# Patient Record
Sex: MALE | Race: OTHER | HISPANIC OR LATINO | URBAN - METROPOLITAN AREA
[De-identification: names, ages, dates, MRNs, and addresses within clinical notes are randomized per-mention and may not be internally consistent; named-entity substitution may affect disease eponyms.]

---

## 2019-04-10 ENCOUNTER — EMERGENCY (EMERGENCY)
Facility: HOSPITAL | Age: 34
LOS: 0 days | Discharge: HOME | End: 2019-04-10
Attending: EMERGENCY MEDICINE
Payer: COMMERCIAL

## 2019-04-10 VITALS
OXYGEN SATURATION: 98 % | SYSTOLIC BLOOD PRESSURE: 128 MMHG | HEART RATE: 83 BPM | TEMPERATURE: 98 F | WEIGHT: 199.96 LBS | RESPIRATION RATE: 16 BRPM | DIASTOLIC BLOOD PRESSURE: 84 MMHG | HEIGHT: 70 IN

## 2019-04-10 DIAGNOSIS — J02.9 ACUTE PHARYNGITIS, UNSPECIFIED: ICD-10-CM

## 2019-04-10 PROCEDURE — 99283 EMERGENCY DEPT VISIT LOW MDM: CPT | Mod: 25

## 2019-04-10 RX ORDER — DEXAMETHASONE 0.5 MG/5ML
10 ELIXIR ORAL ONCE
Qty: 0 | Refills: 0 | Status: COMPLETED | OUTPATIENT
Start: 2019-04-10 | End: 2019-04-10

## 2019-04-10 RX ORDER — BENZOCAINE AND MENTHOL 5; 1 G/100ML; G/100ML
1 LIQUID ORAL ONCE
Qty: 0 | Refills: 0 | Status: COMPLETED | OUTPATIENT
Start: 2019-04-10 | End: 2019-04-10

## 2019-04-10 RX ORDER — IBUPROFEN 200 MG
600 TABLET ORAL ONCE
Qty: 0 | Refills: 0 | Status: COMPLETED | OUTPATIENT
Start: 2019-04-10 | End: 2019-04-10

## 2019-04-10 RX ORDER — IBUPROFEN 200 MG
1 TABLET ORAL
Qty: 15 | Refills: 0 | OUTPATIENT
Start: 2019-04-10 | End: 2019-04-14

## 2019-04-10 RX ADMIN — Medication 600 MILLIGRAM(S): at 07:41

## 2019-04-10 RX ADMIN — Medication 1 TABLET(S): at 07:41

## 2019-04-10 RX ADMIN — Medication 10 MILLIGRAM(S): at 07:40

## 2019-04-10 RX ADMIN — BENZOCAINE AND MENTHOL 1 LOZENGE: 5; 1 LIQUID ORAL at 07:41

## 2019-04-10 NOTE — ED ADULT NURSE NOTE - OBJECTIVE STATEMENT
patient states having a sore throat for 4 days, with fever and earache. Patient states having trouble swallowing. No PMH.

## 2019-04-10 NOTE — ED PROVIDER NOTE - PHYSICAL EXAMINATION
Gen: Alert, Well appearing. NAD    Head: NC, AT, PERRL, EOMI, normal lids/conjunctiva   ENT: Bilateral TM WNL,  patent oropharynx with edematous tonsils with erythema and exudate, uvula midline  Neck: supple, no tenderness/meningismus/JVD   Pulm: Bilateral clear BS, normal resp effort, no wheeze/stridor/retractions  CV: RRR, no M/R/G, +dist pulses   Abd: soft, NT/ND, +BS, no guarding/rebound tenderness  Mskel: no edema/erythema/cyanosis   Skin: no rash   Neuro: AAOx3, no sensory/motor deficits, CN 2-12 intact

## 2019-04-10 NOTE — ED PROVIDER NOTE - OBJECTIVE STATEMENT
33yo male with no pertinent pmh presents with sore throat x 3 days, sub fever, and left ear pain.  no cough, sob.     ROS: No fever/chills. No photophobia/eye pain/changes in vision, + ear pain/sore throat., No chest pain/palpitations. No SOB/cough/stridor. No abdominal pain, N/V/D, no black/bloody bm. No dysuria/frequency/discharge, No headache. No Dizziness.  No rash.  No numbness/tingling/weakness.

## 2019-04-10 NOTE — ED PROVIDER NOTE - NSFOLLOWUPINSTRUCTIONS_ED_ALL_ED_FT
Follow up with your primary care doctor within the next 24-48 hours and bring copy of your results.  Return to the Emergency Department for worsening or persistent symptoms or any other concerns incl. chest pain, shortness of breath, dizziness, inability to tolerate oral intake.  Rest, drink plenty of fluids.  Advance activity as tolerated.  Continue all previously prescribed medications as directed. You can use motrin 600mg every 6-8 hours for pain or fever, and/or Tylenol 650 mg every 4 hours for pain/fever.

## 2019-04-10 NOTE — ED ADULT TRIAGE NOTE - CHIEF COMPLAINT QUOTE
patient reports sore throat since saturday radiating to the left ear, unable to sleep last night, used cepacol and salt water gargle

## 2019-10-25 ENCOUNTER — OUTPATIENT (OUTPATIENT)
Dept: OUTPATIENT SERVICES | Facility: HOSPITAL | Age: 34
LOS: 1 days | Discharge: HOME | End: 2019-10-25

## 2019-10-25 PROBLEM — Z78.9 OTHER SPECIFIED HEALTH STATUS: Chronic | Status: ACTIVE | Noted: 2019-04-10

## 2019-10-28 DIAGNOSIS — F43.20 ADJUSTMENT DISORDER, UNSPECIFIED: ICD-10-CM

## 2022-04-15 ENCOUNTER — APPOINTMENT (OUTPATIENT)
Dept: ORTHOPEDIC SURGERY | Facility: CLINIC | Age: 37
End: 2022-04-15

## 2022-04-19 PROBLEM — Z00.00 ENCOUNTER FOR PREVENTIVE HEALTH EXAMINATION: Noted: 2022-04-19

## 2022-04-21 ENCOUNTER — APPOINTMENT (OUTPATIENT)
Dept: ORTHOPEDIC SURGERY | Facility: CLINIC | Age: 37
End: 2022-04-21
Payer: COMMERCIAL

## 2022-04-21 VITALS — HEIGHT: 70 IN | BODY MASS INDEX: 28.63 KG/M2 | WEIGHT: 200 LBS

## 2022-04-21 DIAGNOSIS — Z78.9 OTHER SPECIFIED HEALTH STATUS: ICD-10-CM

## 2022-04-21 PROBLEM — Z00.00 ENCOUNTER FOR PREVENTIVE HEALTH EXAMINATION: Status: ACTIVE | Noted: 2022-04-21

## 2022-04-21 PROCEDURE — 99072 ADDL SUPL MATRL&STAF TM PHE: CPT

## 2022-04-21 PROCEDURE — 99214 OFFICE O/P EST MOD 30 MIN: CPT

## 2022-04-21 RX ORDER — METHOCARBAMOL 750 MG/1
750 TABLET, FILM COATED ORAL
Qty: 60 | Refills: 3 | Status: ACTIVE | COMMUNITY
Start: 2022-04-21 | End: 1900-01-01

## 2022-04-21 NOTE — PHYSICAL EXAM
[Normal Mood and Affect] : normal mood and affect [Orientated] : orientated [Able to Communicate] : able to communicate [Well Developed] : well developed [Well Nourished] : well nourished [NL (0-180)] : full active forward flexion 0-180 degrees [NL (0-90)] : full external rotation 0-90 degrees [NL (30)] : right lateral bending 30 degrees [Flexion] : flexion [Extension] : extension [Left] : left knee [NL (140)] : flexion 140 degrees [NL (0)] : extension 0 degrees [5___] : hamstring 5[unfilled]/5 [Negative] : negative anterior draw [FreeTextEntry8] : Midline cervical tenderness. [de-identified] : extension 20 degrees [de-identified] : left lateral rotation 25 degrees [de-identified] : right lateral flexion 20 degrees [TWNoteComboBox6] : right lateral rotation 45 degrees [TWNoteComboBox7] : forward flexion 75 degrees [FreeTextEntry9] : IR T12  [de-identified] : +Bindu [] : negative Lachmann
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
## 2022-04-21 NOTE — DATA REVIEWED
[Knee] : knee [MRI] : MRI [Shoulder] : shoulder [Report was reviewed and noted in the chart] : The report was reviewed and noted in the chart [Left] : left [FreeTextEntry2] : Impression:\par 1. Significant improvement regarding effusion and synovitis and capsulitis in the glenohumeral joint, with chronic appearing labral tearing suspected throughout the posterior labrum extending into the inferior and superior labrum posteriorly, and mild glenohumeral joint effusion and capsular thickening.\par 2. Mild biceps tenosynovitis without tear.\par 3. Slight subacromial bursitis anteriorly.\par 4. No rotator cuff tear.\par 5. Subtle contour deformity in the anterior aspect of the humeral head likely related to remote posterior shoulder dislocation episode without evidence of new osseous injury.\par 6. Clinical correlation regarding posterior glenohumeral joint instability is recommended.

## 2022-06-06 ENCOUNTER — APPOINTMENT (OUTPATIENT)
Dept: ORTHOPEDIC SURGERY | Facility: CLINIC | Age: 37
End: 2022-06-06
Payer: COMMERCIAL

## 2022-06-06 VITALS — HEIGHT: 70 IN | WEIGHT: 200 LBS | BODY MASS INDEX: 28.63 KG/M2

## 2022-06-06 DIAGNOSIS — S39.012D STRAIN OF MUSCLE, FASCIA AND TENDON OF LOWER BACK, SUBSEQUENT ENCOUNTER: ICD-10-CM

## 2022-06-06 DIAGNOSIS — S13.4XXD SPRAIN OF LIGAMENTS OF CERVICAL SPINE, SUBSEQUENT ENCOUNTER: ICD-10-CM
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
## 2022-06-06 DIAGNOSIS — M51.26 OTHER INTERVERTEBRAL DISC DISPLACEMENT, LUMBAR REGION: ICD-10-CM

## 2022-06-06 DIAGNOSIS — M25.812 OTHER SPECIFIED JOINT DISORDERS, LEFT SHOULDER: ICD-10-CM

## 2022-06-06 DIAGNOSIS — S43.432D SUPERIOR GLENOID LABRUM LESION OF LEFT SHOULDER, SUBSEQUENT ENCOUNTER: ICD-10-CM

## 2022-06-06 DIAGNOSIS — M22.42 CHONDROMALACIA PATELLAE, LEFT KNEE: ICD-10-CM

## 2022-06-06 DIAGNOSIS — M50.20 OTHER CERVICAL DISC DISPLACEMENT, UNSPECIFIED CERVICAL REGION: ICD-10-CM

## 2022-06-06 PROCEDURE — 99072 ADDL SUPL MATRL&STAF TM PHE: CPT

## 2022-06-06 PROCEDURE — 99214 OFFICE O/P EST MOD 30 MIN: CPT

## 2022-06-06 NOTE — PHYSICAL EXAM
[Normal Mood and Affect] : normal mood and affect [Orientated] : orientated [Able to Communicate] : able to communicate [Well Developed] : well developed [Well Nourished] : well nourished [NL (30)] : right lateral bending 30 degrees [Flexion] : flexion [Extension] : extension [NL (0-180)] : full active forward flexion 0-180 degrees [NL (0-90)] : full external rotation 0-90 degrees [Left] : left knee [NL (140)] : flexion 140 degrees [NL (0)] : extension 0 degrees [5___] : hamstring 5[unfilled]/5 [Negative] : negative anterior draw [FreeTextEntry8] : Midline cervical tenderness. [de-identified] : extension 20 degrees [de-identified] : left lateral rotation 25 degrees [de-identified] : right lateral flexion 20 degrees [TWNoteComboBox6] : right lateral rotation 45 degrees [TWNoteComboBox7] : forward flexion 75 degrees [FreeTextEntry9] : IR T12  [de-identified] : +Bindu [] : negative Lachmann
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
## 2022-06-06 NOTE — REASON FOR VISIT
[FreeTextEntry2] : pain neck/ left shoulder with pain level 8 active and 8 resting / and right knee pain with pain level  9 active and 7 resting.

## 2022-06-06 NOTE — PHYSICAL EXAM
[Normal Mood and Affect] : normal mood and affect [Orientated] : orientated [Able to Communicate] : able to communicate [Well Developed] : well developed [Well Nourished] : well nourished [NL (30)] : right lateral bending 30 degrees [Flexion] : flexion [Extension] : extension [NL (0-180)] : full active forward flexion 0-180 degrees [NL (0-90)] : full external rotation 0-90 degrees [Left] : left knee [NL (140)] : flexion 140 degrees [NL (0)] : extension 0 degrees [5___] : hamstring 5[unfilled]/5 [Negative] : negative anterior draw [FreeTextEntry8] : Midline cervical tenderness. [de-identified] : extension 20 degrees [de-identified] : left lateral rotation 25 degrees [de-identified] : right lateral flexion 20 degrees [TWNoteComboBox6] : right lateral rotation 45 degrees [TWNoteComboBox7] : forward flexion 75 degrees [FreeTextEntry9] : IR T12  [de-identified] : +Bindu [] : negative Lachmann
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
## 2022-06-20 ENCOUNTER — APPOINTMENT (OUTPATIENT)
Dept: ORTHOPEDIC SURGERY | Facility: CLINIC | Age: 37
End: 2022-06-20

## 2022-06-27 ENCOUNTER — APPOINTMENT (OUTPATIENT)
Dept: ORTHOPEDIC SURGERY | Facility: CLINIC | Age: 37
End: 2022-06-27
Payer: COMMERCIAL

## 2022-06-27 VITALS — WEIGHT: 200 LBS | HEIGHT: 70 IN | BODY MASS INDEX: 28.63 KG/M2

## 2022-06-27 PROCEDURE — 99204 OFFICE O/P NEW MOD 45 MIN: CPT | Mod: 25

## 2022-06-27 PROCEDURE — 73030 X-RAY EXAM OF SHOULDER: CPT | Mod: LT

## 2022-06-27 PROCEDURE — 99072 ADDL SUPL MATRL&STAF TM PHE: CPT

## 2022-06-27 PROCEDURE — 73010 X-RAY EXAM OF SHOULDER BLADE: CPT | Mod: LT

## 2022-06-27 PROCEDURE — 20611 DRAIN/INJ JOINT/BURSA W/US: CPT
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
## 2022-06-27 NOTE — ASSESSMENT
[FreeTextEntry1] : We discussed the underlying pathology. \par Treatment options reviewed. \par We reviewed his MRI findings. \par PT is prescribed.\par An injection is indicated today. \par If injection fails, there are surgical options. \par Cautions discussed. \par Questions answered. \par \par Patient seen by Prateek Fernandes M.D.\par Entered by Bee Rendon acting as scribe. \par \par Procedure Name: Large Joint Injection / Aspiration: Depomedrol, Lidocaine and Guidance Ultrasound\par \par Large Joint Injection was performed because of pain and inflammation.\par Depomedrol: An injection of Depomedrol 40 mg , 2 cc.\par Lidocaine: An injection of Lidocaine 1 mg , 13 cc.\par \par Medication was injected in the left subacromial space. Patient has tried OTC's including aspirin, Ibuprofen, Aleve etc or prescription NSAIDS, and/or exercises at home and/ or physical therapy without satisfactory response. After verbal consent using sterile preparation and technique. The risks, benefits, and alternatives to cortisone injection were explained in full to the patient. Risks outlined include but are not limited to infection, sepsis, bleeding, scarring, skin discoloration, temporary increase in pain, syncopal episode, failure to resolve symptoms, allergic reaction, symptom recurrence, and elevation of blood sugar in diabetics. Patient understood the risks. All questions were answered. After discussion of options, patient requested an injection. Oral informed consent was obtained and sterile prep was done of the injection site. Sterile technique was utilized for the procedure including the preparation of the solutions used for the injection. Patient tolerated the procedure well. Advised to ice the injection site this evening. Prep with betadine locally to site. Sterile technique used. Patient tolerated procedure well. Post Procedure Instructions: Patient was advised to call if redness, pain, or fever occur and apply ice for 15 min. out of every hour for the next 12-24 hours as tolerated. Patient was advised to rest the joint(s) for 3 days. Ultrasound Guidance was used for the following reasons: for precise injection in area of tear. Visualization of the needle and placement of injection was performed without complication.

## 2022-06-27 NOTE — IMAGING
[Left] : left shoulder [FreeTextEntry1] : The GH joint is good. The AC joint is good. [FreeTextEntry5] : There is a type II-III acromion.
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
## 2022-06-27 NOTE — PHYSICAL EXAM
[Left] : left shoulder [Right] : right shoulder [Severe] : severe [Moderate] : moderate [] : no atrophy [Sitting] : sitting [Minimal] : minimal [TWNoteComboBox4] : passive forward flexion 160 degrees [TWNoteComboBox6] : internal rotation L2 [de-identified] : external rotation 90 degrees

## 2022-06-29 ENCOUNTER — APPOINTMENT (OUTPATIENT)
Dept: PAIN MANAGEMENT | Facility: CLINIC | Age: 37
End: 2022-06-29

## 2022-06-29 DIAGNOSIS — S33.5XXD SPRAIN OF LIGAMENTS OF LUMBAR SPINE, SUBSEQUENT ENCOUNTER: ICD-10-CM

## 2022-07-13 NOTE — DATA REVIEWED
LAST EYLEA TX 2/3/21 WITH DR. Miriam Baker. [FreeTextEntry1] : MRI Left Shoulder 3/25/22:\par \par The AC joint is ok. There are slight biceps changes. There is good muscle. There is a type II-III acromion. There is no major cuff tear.

## 2022-07-15 ENCOUNTER — APPOINTMENT (OUTPATIENT)
Dept: PAIN MANAGEMENT | Facility: CLINIC | Age: 37
End: 2022-07-15

## 2022-07-20 ENCOUNTER — APPOINTMENT (OUTPATIENT)
Dept: PAIN MANAGEMENT | Facility: CLINIC | Age: 37
End: 2022-07-20

## 2022-07-20 VITALS — HEIGHT: 70 IN | WEIGHT: 200 LBS | BODY MASS INDEX: 28.63 KG/M2

## 2022-07-20 PROCEDURE — 99214 OFFICE O/P EST MOD 30 MIN: CPT

## 2022-07-20 PROCEDURE — 99072 ADDL SUPL MATRL&STAF TM PHE: CPT

## 2022-07-20 NOTE — PHYSICAL EXAM
[] : Numbness radiates to right arm with motion [4___] : right triceps 4[unfilled]/5 [de-identified] : left lateral rotation 45 degrees [TWNoteComboBox6] : right lateral rotation 60 degrees

## 2022-07-20 NOTE — ASSESSMENT
[FreeTextEntry1] : After discussing various treatment options with the patient including but not limited to oral medications, physical therapy, exercise, modalities as well as interventional spinal injections, we have decided with the following plan:\par \par 1) Intervention Injection Therapy:\par I personally reviewed the MRI/CT scan images and agree with the radiologist's report. The radiological findings were discussed with the patient.\par The risks, benefits, contents and alternatives to injection were explained in full to the patient. Risks outlined include but are not limited to infection,sepsis, bleeding, post-dural puncture headache, nerve damage, temporary increase in pain, syncopal episode, failure to resolve symptoms, allergic reaction, symptom recurrence, and elevation of blood sugar in diabetics. Cortisone may cause immunosuppression. Patient understands the risks. All questions were answered. After discussion of options, patient requested an injection. Information regarding the injection was given to the patient. Which medications to stop prior to the injection was explained to the patient as well.\par \par Follow up in 1-2 weeks post injection for re-evaluation. \par Continue Home exercises, stretching, activity modification, physical therapy, and conservative care.\par \par Patient is presenting with acute/sub-acute radicular pain with impairment in ADLs and functionality.  The pain has not responded to  conservative care including nsaid therapy and/or physical therapy.  There is no bleeding tendency, unstable medical condition, or systemic infection. \par \par C7-T1 Cervical Epidural Steroid Injection under fluoroscopic guidance with image.
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
## 2022-07-20 NOTE — DATA REVIEWED
[MRI] : MRI [Cervical Spine] : cervical spine [Report was reviewed and noted in the chart] : The report was reviewed and noted in the chart [I independently reviewed and interpreted images and report] : I independently reviewed and interpreted images and report [I reviewed the films/CD and agree] : I reviewed the films/CD and agree

## 2022-07-25 ENCOUNTER — APPOINTMENT (OUTPATIENT)
Dept: ORTHOPEDIC SURGERY | Facility: CLINIC | Age: 37
End: 2022-07-25

## 2022-07-25 VITALS — BODY MASS INDEX: 28.63 KG/M2 | WEIGHT: 200 LBS | HEIGHT: 70 IN

## 2022-07-25 PROCEDURE — 99072 ADDL SUPL MATRL&STAF TM PHE: CPT

## 2022-07-25 PROCEDURE — 99214 OFFICE O/P EST MOD 30 MIN: CPT

## 2022-07-25 RX ORDER — CELECOXIB 200 MG/1
200 CAPSULE ORAL DAILY
Qty: 30 | Refills: 0 | Status: ACTIVE | COMMUNITY
Start: 2022-07-25 | End: 1900-01-01
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
## 2022-07-25 NOTE — ASSESSMENT
[FreeTextEntry1] : .\par We discussed treatment options, both non-operative and operative.  I do think he is a candidate for surgery.  Pain relief is a goal as well as improving function and motion.  I reviewed surgical techniques pictorially in the books that I co-edited.\par \par Interscalene anesthesia, general anesthesia and postoperative pain management were discussed.  The importance of physical therapy postoperatively, the gradual recovery and the rehabilitation program with initial driving restrictions were noted.  The use of a Cryo-Cuff by [employer] and a sling for functional recovery was reviewed.  He understands there are no guarantees.  The benefits of decreased pain, increased function and restoring anatomy were outlined.  The risks were reviewed including, but not limited to, infection, failure, bleeding, stiffness, pain, clotting, fracture, re-tear if there is a repair, hardware failure, deformity, functional limitation, scarring, neurovascular compromise, and narcotic use issues.  Under certain circumstances we discussed, further surgery may be indicated.\par \par He understands that 100% recovery is not assured, and the desired level of function may not be achievable.  We discussed the potential for a prolonged recovery course and the potential for this to affect his activities, which could include a work regimen.  His questions were answered.  Other opinions can be pursued, as we discussed.\par \par He does wish to proceed with surgery.  This would include a left shoulder arthroscopy, debridement, synovectomy, decompression, distal clavicle resection.  We will schedule this at the earliest mutual convenient time.  We will assess his shoulder in light of the prior posterior dislocation from >2 years ago.\par \par Patient seen by Prateek Fernandes M.D.\par Entered by Bee Rendon acting as scribe.

## 2022-07-25 NOTE — PHYSICAL EXAM
[Left] : left shoulder [Severe] : severe [Moderate] : moderate [Right] : right shoulder [Sitting] : sitting [Minimal] : minimal [] : no atrophy [TWNoteComboBox4] : passive forward flexion 160 degrees [de-identified] : external rotation 90 degrees [TWNoteComboBox6] : internal rotation L2

## 2022-07-25 NOTE — DATA REVIEWED
[FreeTextEntry1] : MRI Left Shoulder 3/25/22:  The AC joint is ok. There are slight biceps changes. There is good muscle. There is a type II-III acromion. There is no major cuff tear.

## 2022-08-17 ENCOUNTER — APPOINTMENT (OUTPATIENT)
Dept: PAIN MANAGEMENT | Facility: CLINIC | Age: 37
End: 2022-08-17

## 2022-08-17 VITALS — WEIGHT: 200 LBS | HEIGHT: 70 IN | BODY MASS INDEX: 28.63 KG/M2

## 2022-08-17 PROCEDURE — 99072 ADDL SUPL MATRL&STAF TM PHE: CPT

## 2022-08-17 PROCEDURE — 99455 WORK RELATED DISABILITY EXAM: CPT

## 2022-08-17 NOTE — PHYSICAL EXAM
[Rotation to left] : rotation to left [Left Radial Forearm] : left radial forearm [5] : left 5th digit [Extension] : extension [de-identified] : left lateral rotation 60 degrees [TWNoteComboBox6] : right lateral rotation 75 degrees [] : no swelling [TWNoteComboBox7] : forward flexion 75 degrees [de-identified] : extension 20 degrees
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
## 2022-08-23 ENCOUNTER — APPOINTMENT (OUTPATIENT)
Dept: ORTHOPEDIC SURGERY | Facility: CLINIC | Age: 37
End: 2022-08-23

## 2022-09-12 ENCOUNTER — APPOINTMENT (OUTPATIENT)
Dept: ORTHOPEDIC SURGERY | Facility: CLINIC | Age: 37
End: 2022-09-12

## 2022-09-15 ENCOUNTER — APPOINTMENT (OUTPATIENT)
Age: 37
End: 2022-09-15

## 2022-10-04 ENCOUNTER — APPOINTMENT (OUTPATIENT)
Dept: PAIN MANAGEMENT | Facility: CLINIC | Age: 37
End: 2022-10-04

## 2023-01-23 ENCOUNTER — APPOINTMENT (OUTPATIENT)
Dept: ORTHOPEDIC SURGERY | Facility: CLINIC | Age: 38
End: 2023-01-23
Payer: OTHER MISCELLANEOUS

## 2023-01-23 VITALS — HEIGHT: 70 IN | BODY MASS INDEX: 28.63 KG/M2 | WEIGHT: 200 LBS

## 2023-01-23 DIAGNOSIS — S43.432D SUPERIOR GLENOID LABRUM LESION OF LEFT SHOULDER, SUBSEQUENT ENCOUNTER: ICD-10-CM

## 2023-01-23 PROCEDURE — 99455 WORK RELATED DISABILITY EXAM: CPT

## 2023-01-23 PROCEDURE — 99072 ADDL SUPL MATRL&STAF TM PHE: CPT

## 2023-01-23 NOTE — PHYSICAL EXAM
[Left] : left shoulder [] : no ecchymosis [TWNoteComboBox6] : internal rotation 35 degrees [de-identified] : external rotation 65 degrees

## 2023-01-23 NOTE — HISTORY OF PRESENT ILLNESS
[6] : 6 [5] : 5 [Dull/Aching] : dull/aching [Localized] : localized [Throbbing] : throbbing [de-identified] : has known post dislocation and post labral tear, certain otions make him feels worse and works a s ,on no meds [FreeTextEntry1] : left shoulder  [de-identified] : none

## 2023-01-23 NOTE — WORK
[Has the patient reached Maximum Medical Improvement? If yes, indicate date___] : Yes, on [unfilled] [Is there permanent partial impairment?] : Yes [Left] : left [Could this patient perform his/her at-injury work activities with restrictions? If yes, specify below.] : Yes [Could this patient perform any work activities with or without restrictions? Explain below.] : Yes [FreeTextEntry7] : shoulder [FreeTextEntry8] : IR 35 deg, ER 45 deg [FreeTextEntry5] : 15 [de-identified] : measured with gonio,  3 times [If not working, could reasonable accommodations be made to enable patient to perform work? Explain.] : No [Has the patient had an injury/illness since the date of injury which impacts residual functional capacity?] : No [Would the patient benefit from vocational rehabilitation? If Yes, explain below.] :  No

## 2023-05-31 ENCOUNTER — APPOINTMENT (OUTPATIENT)
Dept: ORTHOPEDIC SURGERY | Facility: CLINIC | Age: 38
End: 2023-05-31
Payer: COMMERCIAL

## 2023-05-31 VITALS — BODY MASS INDEX: 30.06 KG/M2 | HEIGHT: 70 IN | WEIGHT: 210 LBS

## 2023-05-31 PROCEDURE — 73010 X-RAY EXAM OF SHOULDER BLADE: CPT | Mod: LT

## 2023-05-31 PROCEDURE — 99214 OFFICE O/P EST MOD 30 MIN: CPT | Mod: 25

## 2023-05-31 PROCEDURE — 73030 X-RAY EXAM OF SHOULDER: CPT | Mod: LT

## 2023-05-31 PROCEDURE — 20611 DRAIN/INJ JOINT/BURSA W/US: CPT | Mod: LT
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
## 2023-05-31 NOTE — IMAGING
[Left] : left shoulder [FreeTextEntry1] : The GH joint is good. There are AC spurs inferiorly. [FreeTextEntry5] : There is a type III acromion.

## 2023-05-31 NOTE — PHYSICAL EXAM
[Left] : left shoulder [Severe] : severe [Moderate] : moderate [Right] : right shoulder [Sitting] : sitting [Minimal] : minimal [4 ___] : forward flexion 4[unfilled]/5 [] : no atrophy [TWNoteComboBox4] : passive forward flexion 160 degrees [TWNoteComboBox6] : internal rotation L2 [de-identified] : external rotation 90 degrees
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
## 2023-05-31 NOTE — ASSESSMENT
[FreeTextEntry1] : We discussed his course.\par He has persistent symptoms, refractory to non-op measures.\par In 2022, surgery would have included a left shoulder arthroscopy, debridement, synovectomy, decompression, distal clavicle resection.\par We will assess his shoulder in light of the prior posterior dislocation from >2 years ago.\par He is considering the timing.\par An injection is requested.\par A L SA injection is planned.\par A L AC injection could be considered.\par These are just temporary until he can plan to proceed with the medically indicated surgery.\par Aleve use discussed.\par A repeat MRI would planned.\par His questions were answered.\par \par Patient seen by Prateek Fernandes M.D.\par Progress note completed by Gay MULLIGAN.\par Patient seen by Gay MULLIGAN under the supervision of Dr. Prateek Fernandes.\par Entered by Gay MULLIGAN acting as a scribe for Dr. Prateek Fernandes.\par \par Procedure Name: Large Joint Injection / Aspiration: Depomedrol, Lidocaine and Guidance Ultrasound\par \par Large Joint Injection was performed because of pain and inflammation.\par Depomedrol: An injection of Depomedrol 40 mg , 2 cc.\par Lidocaine: An injection of Lidocaine 1 mg , 13 cc.\par \par Medication was injected in the left subacromial space. Patient has tried OTC's including aspirin, Ibuprofen, Aleve etc or prescription NSAIDS, and/or exercises at home and/ or physical therapy without satisfactory response. The risks, benefits, and alternatives to steroid injection were explained in full to the patient. Risks outlined include but are not limited to infection, sepsis, bleeding, scarring, skin discoloration, temporary increase in pain, syncopal episode, failure to resolve symptoms, allergic reaction, symptom recurrence, and elevation of blood sugar in diabetics. Patient understood the risks. All questions were answered. After discussion, patient requested an injection. Oral informed consent was obtained.  Sterile preparation with betadine and aseptic technique was utilized for the procedure, including the preparation of the solutions used for the injection. Patient tolerated the procedure well.  \par Post Procedure Instructions: Patient was advised to call if redness, pain, or fever occur and apply ice for 15 min. out of every hour for the next 12-24 hours as tolerated. Patient was advised to rest the joint(s) for 3 days.  Advised to ice the injection site this evening.\par Ultrasound Guidance was used for the following reasons: for precise injection in area of tear. Visualization of the needle and placement of injection was performed without complication.\par

## 2023-07-10 ENCOUNTER — APPOINTMENT (OUTPATIENT)
Dept: ORTHOPEDIC SURGERY | Facility: CLINIC | Age: 38
End: 2023-07-10
Payer: COMMERCIAL

## 2023-07-10 VITALS — WEIGHT: 205 LBS | HEIGHT: 70 IN | BODY MASS INDEX: 29.35 KG/M2

## 2023-07-10 DIAGNOSIS — S43.025D: ICD-10-CM

## 2023-07-10 PROCEDURE — 99214 OFFICE O/P EST MOD 30 MIN: CPT

## 2023-07-10 RX ORDER — DICLOFENAC SODIUM AND MISOPROSTOL 75; 200 MG/1; UG/1
75-0.2 TABLET, DELAYED RELEASE ORAL
Qty: 60 | Refills: 0 | Status: ACTIVE | COMMUNITY
Start: 2023-07-10 | End: 1900-01-01

## 2023-07-10 NOTE — PHYSICAL EXAM
[Left] : left shoulder [Moderate] : moderate [4 ___] : forward flexion 4[unfilled]/5 [Right] : right shoulder [Sitting] : sitting [Minimal] : minimal [Severe] : severe [] : no atrophy [TWNoteComboBox4] : passive forward flexion 160 degrees [TWNoteComboBox6] : internal rotation L2 [de-identified] : external rotation 90 degrees
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
## 2023-07-10 NOTE — ASSESSMENT
[FreeTextEntry1] : In 2022, surgery would have included a left shoulder arthroscopy, debridement, synovectomy, decompression, distal clavicle resection.\par We will assess his shoulder in light of the prior posterior dislocation from >2 years ago.\par A repeat MRI is indicated.\par There are surgical options. \par Arthrotec is prescribed. \par He will follow up with his wife after MRI to discuss surgery. \par Questions answered. \par \par Patient seen by Prateek Fernandes M.D.\par Progress note completed by Gay MULLIGAN.\par Patient seen by Gay MULLIGAN under the supervision of Dr. Prateek Fernandes.\par Entered by Gay MULLIGAN acting as a scribe for Dr. Prateek Fernandes.\par Entered by Bee Rendon acting as scribe.

## 2023-07-10 NOTE — PHYSICAL EXAM
[Left] : left shoulder [Moderate] : moderate [4 ___] : forward flexion 4[unfilled]/5 [Right] : right shoulder [Sitting] : sitting [Minimal] : minimal [Severe] : severe [] : no atrophy [TWNoteComboBox4] : passive forward flexion 160 degrees [TWNoteComboBox6] : internal rotation L2 [de-identified] : external rotation 90 degrees

## 2024-02-26 NOTE — ED PROVIDER NOTE - NS_EDPROVIDERDISPOUSERTYPE_ED_A_ED
Evaluation Appointment    Name: Ana Hoffman YOB: 2015   Parents: Lara Winston Age: 8 y.o. 3 m.o.   Date(s) of Assessment: 2/16/2024 Gender: Female      Examiner: Bridgette Graham PsyD        LENGTH OF SESSION:  210 face-to-face    CPT CODE: test administration and scoring by psychologist (74743 and [id number] = 240 minutes) and psychological test interpretation, compilation of results and recommendations ([id number] and [id number] = 120 minutes)    REASON FOR ENCOUNTER:    Ana Hoffman is a 8 y.o. 2 m.o. 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 Of note, Ana experienced her first clinical seizure on the day of evaluation, which was followed by a period of seizure activity captured on EEG. She received subsequent diagnosis of complex partial epilepsy and has initiated AED (Keppra 500 mg 2x daily).    PARENT INTERVIEW  Biological Mother attended the evaluation.    TESTING CONDITIONS & BEHAVIORAL OBSERVATIONS:  Ana was seen for evaluation at [facility]. Her mother participated in an initial interview to identify areas of concern and establish goals for evaluation. During the subsequent testing session Ana was assessed in a private room with one-to-one instruction provided by psychologist throughout. Testing lasted approximately 4-hours which was comprised of direct interaction and use of psychometric measures.     Ana arrived to the evaluation visit accompanied by her mother. She was oriented to all spheres with typical energy, no evident speech or motor abnormalities. Ana's mother noted that she had experienced first seizure on the drive to this appointment, lasted 2-3 minutes with 5-minute postictal state characterized by confusion/aphasia. She reported feeling Ana had returned to baseline prior to arrival. Ana was able to recall seizure. Ana's mother  reached out to neurology clinic regarding guidelines for follow-up or urgent clinic visit. Ana was permitted to begin some testing (portions of achievement test limited to rote skills, no portions involving time constraint or academic applications). A break was given to complete 2-hour window prior to any cognitive testing, based on recommendation in literature for baseline testing in epilepsy. Ana then participated in cognitive/neuropsychological tests with some symptoms of inattention and low working memory which appeared normative of children with disorders such as ADHD and were consistent with reported challenges at school. Following testing, mother heard back from clinic to proceed for evaluation and did so with findings incorporated into histories and test interpretation.    Ana transitioned independently and without difficulty to the evaluation scenario. Rapport was easily built with examiner, and she appeared comfortable with no overt symptoms of anxiety. Ana demonstrated some uncertainty and made remarks about difficulty in reading/ writing tasks; but otherwise, was eager to participate in measures given. She demonstrated symptoms of cognitive executive dysfunction throughout evaluation. This included getting lost in larger portions of material (e.g., narrative paragraphs) and needing to re-attend or find her place, need for repetition of instructions, inattentive or off-topic remarks and being easily distracted by noises in hallway. She frequently remarked on feeling tired due to early travel. Ana demonstrated some mild hyperactivity which included fidgeting or moving around in her seat and needing to stand to complete work, but this did not appear to impede upon task completion. No other cognitive symptoms such as difficulties with word finding, exaggerated response time, attentional lapses or forgetfulness were observed. No clinical seizure activity was observed.     Given onset of seizures on the  day of testing it is possible Ana's test results are impacted by her diagnosis of epilepsy and presence of non-observable or subclinical seizure activity prior to and during testing cannot be ruled out. However, a set of guidelines for testing in the context of ongoing epilepsy (be it for baseline or when seizure are intractable) were followed and her mother reported functioning to appear consistent with pre-seizure baseline. Based on these considerations and Ana's engagement, results are considered a representation of current functioning. Re-evaluation is warranted following control of seizures as defined as 6-months seizure free.    SOURCES OF INFORMATION:  The following sources of information were reviewed and battery of tests administered for the purpose of establishing diagnosis, current level of developmental functioning and need for treatment:    Diagnostic Interview  Review of Medical Record   Wechsler Intelligence Scales for Children - 5th Edition (WISC-V)  A Developmental Neuropsychological Assessment - 2nd Edition (NEPSY-II), select subtests.  Wide Range Assessment of Learning and Memory - 3rd Edition (WRAML-3), brief form  Wechsler Individual Achievement Test - 4th Edition (WIAT-4), including language index.  Behavior Assessment System for Children - 3rd Edition (BASC-3), Parent Report  Behavior Rating Inventory of Executive Function - 2nd Edition (BRIEF-2), Parent Report  Adaptive Behavior Assessment System - 3rd Edition (ABAS-3), Parent Report  *Teacher report measures were requested but not returned as of date of this report    SUIMMARY OF RESULTS AND DIAGNOSTIC IMPRESSION:  For a full copy of results including tables of scores see report available in media section. In summary:    The present represents Ana's first comprehensive evaluation which included diagnostic interview, objective observer report, direct interaction and psychometric testing with Ana. Ana engaged well with examiner socially  and overall demonstrated motivation and interest in measures. This was particularly evident in earlier administered tests on which Ana demonstrated good effort and use of study skills or problem-solving skills. Attention faded with time, despite provision of breaks and use of reinforcement, which is not uncommon relative to same-age peers, but bears mention in significance on scores as Ana began to carrera through work, make more inattentive errors, and show more confusion or challenges with working memory.    Overall, Ana's performance on measures was within normal limits suggesting typical intellectual, language, social-emotional and adaptive development as well as educational attainment. There were some patterns of low performance in executive function however which related to sustained attention, self-monitoring, and working memory. Specifically, Ana's performance on the working memory index fell below the 25th percentile of same-age peers, and her performance was increasingly variable on auditory attention (<25th percentile) vs alternating response set (~50th) and rapid naming of shapes (<10th) vs inhibition/switching (~50th). Combined interpretation of scores, behavioral observations, and consideration of testing conditions (structured, 1:1, distraction free) suggests that Ana shows a clinically significant but mild weakness in attention and working memory which is exacerbated with increased work duration and related lapses in effort. This is a typical pattern seen in children with attentional disorders and is expected to be mirrored in classroom learning and standard testing procedures particularly as demands of school require increased independence and duration in expected work completion.    While executive dysfunction was present, as referenced it was characteristic of children with attentional disorder and followed a pattern of occurring in simpler material and not that which was more difficult but  perceived as novel or interesting. Ana was therefore at times able to show capacity for higher or more complex executive functioning skills including complex attention (divided attention, attentional vigilance), task fluency/ processing speed, cognitive flexibility, self-monitoring and problem-solving. Additionally, weakness in working memory did not translate to impairment in overall memory and learning capabilities. No problems with word finding or organization of verbal responses were measured or observed.     Overall, results of this evaluation would support a diagnosis of Attention-Deficit/ Hyperactivity Disorder (ADHD), predominantly inattentive type, mild; based on patterns in performance-based testing alongside parent report and history of learning problems. There is no concern for more widespread impairments. However, onset of seizure activity in the immediate timeframe of testing is important to conceptualization and provides an alternative explanation to inattention which may be acute and resolve with increased seizure control rather than neurodevelopmental in nature. The diagnosis of ADHD is given, based on presence of reported symptoms with duration of greater than one year before clinical seizure onset. However, it is provisional and warrants re-evaluation with seizure control as defined as 6-months seizure free.    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 Plan to review results of psychological evaluation with Ana's caregivers in a feedback session, at which time the final report will be scanned into the electronic chart.            Attending Attestation (For Attendings USE Only)...

## 2024-04-29 ENCOUNTER — APPOINTMENT (OUTPATIENT)
Dept: ORTHOPEDIC SURGERY | Facility: CLINIC | Age: 39
End: 2024-04-29
Payer: COMMERCIAL

## 2024-04-29 DIAGNOSIS — M75.42 IMPINGEMENT SYNDROME OF LEFT SHOULDER: ICD-10-CM

## 2024-04-29 DIAGNOSIS — M25.512 PAIN IN LEFT SHOULDER: ICD-10-CM

## 2024-04-29 PROCEDURE — 99214 OFFICE O/P EST MOD 30 MIN: CPT

## 2024-04-29 RX ORDER — CELECOXIB 200 MG/1
200 CAPSULE ORAL DAILY
Qty: 30 | Refills: 0 | Status: ACTIVE | COMMUNITY
Start: 2024-04-29 | End: 1900-01-01

## 2024-04-29 NOTE — PHYSICAL EXAM
[Left] : left shoulder [Severe] : severe [Moderate] : moderate [4 ___] : forward flexion 4[unfilled]/5 [Right] : right shoulder [Sitting] : sitting [Minimal] : minimal [5___] : external rotation 5[unfilled]/5 [] : no atrophy [TWNoteComboBox4] : passive forward flexion 160 degrees [de-identified] : external rotation 90 degrees [TWNoteComboBox6] : internal rotation L2

## 2024-04-29 NOTE — DATA REVIEWED
[FreeTextEntry1] : MRI Left Shoulder 3/25/22:  The AC joint is ok. There are slight biceps changes. There is good muscle. There is a type II-III acromion. There is no major cuff tear.   MRI L SHOULDER [facility] 4/16/24:  There is inflamed inferior capsule. There are no major cuff tears. The AC and GH joints are ok. The muscle is good. The labrum seems ok.  L XR 5/31/23: The GH joint is good. There are AC spurs inferiorly. There is a type III acromion.

## 2024-04-29 NOTE — ASSESSMENT
[FreeTextEntry1] : We reviewed the MRI findings.  He is still a candidate for surgery. Surgery would include a left shoulder arthroscopy, debridement, synovectomy, decompression, distal clavicle resection. He may also require a lysis of adhesions. He feels the time off from work is prohibitive for him to proceed with surgery. Celebrex is prescribed.  Questions answered.  Patient seen by Prateek Fernandes M.D. Progress note completed by Gay MULLIGAN. Patient seen by Gay MULLIGAN under the supervision of Dr. Prateek Fernandes. Entered by Bee Rendon acting as scribe.
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
## 2024-05-15 ENCOUNTER — APPOINTMENT (OUTPATIENT)
Dept: PAIN MANAGEMENT | Facility: CLINIC | Age: 39
End: 2024-05-15

## 2024-05-16 ENCOUNTER — APPOINTMENT (OUTPATIENT)
Dept: PAIN MANAGEMENT | Facility: CLINIC | Age: 39
End: 2024-05-16
Payer: COMMERCIAL

## 2024-05-16 VITALS — WEIGHT: 223 LBS | BODY MASS INDEX: 31.92 KG/M2 | HEIGHT: 70 IN

## 2024-05-16 PROCEDURE — 99214 OFFICE O/P EST MOD 30 MIN: CPT

## 2024-05-16 NOTE — PHYSICAL EXAM
[Rotation to left] : rotation to left [Left Radial Forearm] : left radial forearm [5] : left 5th digit [Extension] : extension [de-identified] : left lateral rotation 60 degrees [TWNoteComboBox6] : right lateral rotation 75 degrees [] : no swelling [TWNoteComboBox7] : forward flexion 75 degrees [de-identified] : extension 20 degrees

## 2024-05-16 NOTE — ASSESSMENT
[FreeTextEntry1] : After discussing various treatment options with the patient including but not limited to oral medications, physical therapy, exercise, modalities as well as interventional spinal injections, we have decided with the following plan:  1. A MRI is indicated as there has been failure of numerous conservative therapies over the last 6-8 weeks. these include but are not limited to medication therapy and physical therapy. It is recognized that repeat imaging studies and other tests may be warranted by the clinical course and/or to follow the progress of treatment in some cases. It may be of value to repeat diagnostic procedures (ie imaging studies) during the course of care to reassess or stage the pathology when there is progression of symptoms or findings, prior to surgical interventions and/or therapeutic injections when clinically indicated.   2. The patient would benefit from trial of physical therapy. Short and Long Term goals would be improvement of pain level, improvement of range of motion, improvement of strength and overall improvement of quality of life.  Patient instructed to continue both active and passive therapy, at home as an extension of the treatment process in order to maintain improvement.   Goals: improve cardiovascular fitness, reduce edema, improve muscle strength, improve connective tissue strength and integrity, increase bone density, promote circulation to enhance soft tissue healing, improvement of muscle recruitment, increased ROM and promotion of normal movement.
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
## 2024-05-20 ENCOUNTER — APPOINTMENT (OUTPATIENT)
Dept: PAIN MANAGEMENT | Facility: CLINIC | Age: 39
End: 2024-05-20

## 2024-05-21 ENCOUNTER — APPOINTMENT (OUTPATIENT)
Dept: MRI IMAGING | Facility: CLINIC | Age: 39
End: 2024-05-21
Payer: COMMERCIAL

## 2024-05-21 PROCEDURE — 72141 MRI NECK SPINE W/O DYE: CPT

## 2024-06-03 ENCOUNTER — APPOINTMENT (OUTPATIENT)
Dept: ORTHOPEDIC SURGERY | Facility: CLINIC | Age: 39
End: 2024-06-03

## 2024-06-06 ENCOUNTER — APPOINTMENT (OUTPATIENT)
Dept: PAIN MANAGEMENT | Facility: CLINIC | Age: 39
End: 2024-06-06
Payer: COMMERCIAL

## 2024-06-06 VITALS — HEIGHT: 71 IN | BODY MASS INDEX: 31.08 KG/M2 | WEIGHT: 222 LBS

## 2024-06-06 DIAGNOSIS — M54.12 RADICULOPATHY, CERVICAL REGION: ICD-10-CM

## 2024-06-06 PROCEDURE — J3490M: CUSTOM

## 2024-06-06 PROCEDURE — 20552 NJX 1/MLT TRIGGER POINT 1/2: CPT

## 2024-06-06 PROCEDURE — 99214 OFFICE O/P EST MOD 30 MIN: CPT | Mod: 25

## 2024-06-06 RX ORDER — GABAPENTIN 300 MG/1
300 CAPSULE ORAL
Qty: 60 | Refills: 2 | Status: ACTIVE | COMMUNITY
Start: 2024-05-16 | End: 1900-01-01

## 2024-06-06 NOTE — ASSESSMENT
[FreeTextEntry1] : After discussing various treatment options with the patient including but not limited to oral medications, physical therapy, exercise, modalities as well as interventional spinal injections, we have decided with the following plan:  1 The patient would benefit from trial of physical therapy. Short and Long Term goals would be improvement of pain level, improvement of range of motion, improvement of strength and overall improvement of quality of life.  Patient instructed to continue both active and passive therapy, at home as an extension of the treatment process in order to maintain improvement.   Goals: improve cardiovascular fitness, reduce edema, improve muscle strength, improve connective tissue strength and integrity, increase bone density, promote circulation to enhance soft tissue healing, improvement of muscle recruitment, increased ROM and promotion of normal movement.   2) The risks, benefits, contents and alternatives to injection were explained in full to the patient.  Risks outlined include but are not limited to infection,sepsis, bleeding, post-dural puncture headache, nerve damage, temporary increase in pain, syncopal episode, failure to resolve symptoms, allergic reaction, symptom recurrence, and elevation of blood sugar in diabetics. Cortisone may cause immunosuppression.  Patient understands the risks.  All questions were answered.  After discussion of options, patient requested an injection.  Information regarding the injection was given to the patient.  Which medications to stop prior to the injection was explained to the patient as well  Patient is presenting with acute/sub-acute radicular pain with impairment in ADLs and functionality.  The pain has not responded sufficiently to  conservative care including nsaid therapy and/or physical therapy.  There is no bleeding tendency, unstable medical condition, or systemic infection. The purpose of the spinal injections is to facilitate active therapy by providing short term relief through reduction of pain and inflammation.   Injections, by themselves, are not likely to provide long-term relief. Rather, active rehabilitation with modified work achieves long-term relief by increasing active ROM, strength and stability.   C7-T1 Cervical Epidural Steroid Injection under fluoroscopic guidance with image. Of note, the C7-T1 level has been determined to be the safest level to inject in the cervical spine as the epidural space is the largest. Despite pathology at different levels, studies have shown that the medication will spread up to the most cranial level, despite the level of injection.   2) I would recommend a trial of neuropathic medication as patient presents with signs of nerve irritation. (ie burning, paresthesias etc) Goals of therapy would be to improve pain and overall QOL. Side effects reviewed with patient. Patient will call or stop medication if given side effects occur.  gabapentin   3) The risks, benefits, contents and alternatives to injection were explained in full to the patient.  Risks outlined include but are not limited to infection, sepsis, bleeding, scarring, skin discoloration, temporary increase in pain, syncopal episode, failure to resolve symptoms, allergic reaction, flare reaction, permanent white skin discoloration, symptom recurrence, and elevation of blood sugar in diabetics.  Patient understood the risks.  All questions were answered.  After discussion of options, patient requested an injection.  Oral informed consent was obtained and sterile prep was done of the injection site.  Sterile technique was used to introduce the mixture. The mixture consisted of 2 cc 1% lidocaine, 2cc 0.25% marcaine, and 20mg of kenalog.  Patient tolerated the procedure well.  Patient advised to ice the injection site this evening.  Signs and symptoms of infection reviewed and patient advised to call immediately for redness, fevers, and/or chills.
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
## 2024-06-06 NOTE — PROCEDURE
[Trigger point 1-2 muscle groups] : trigger point 1-2 muscle groups [Bilateral] : bilaterally of the [Cervical paraspinal muscle] : cervical paraspinal muscle [Trapezius muscle] : trapezius muscle [Rhomboid muscle] : rhomboid muscle [Pain] : pain

## 2024-06-06 NOTE — PHYSICAL EXAM
[Rotation to left] : rotation to left [Left Radial Forearm] : left radial forearm [5] : left 5th digit [Extension] : extension [de-identified] : left lateral rotation 60 degrees [TWNoteComboBox6] : right lateral rotation 75 degrees [] : no swelling [TWNoteComboBox7] : forward flexion 75 degrees [de-identified] : extension 20 degrees

## 2024-09-17 ENCOUNTER — APPOINTMENT (OUTPATIENT)
Dept: PAIN MANAGEMENT | Facility: CLINIC | Age: 39
End: 2024-09-17

## 2024-09-17 NOTE — PHYSICAL EXAM
[Rotation to left] : rotation to left [Left Radial Forearm] : left radial forearm [5] : left 5th digit [Extension] : extension [de-identified] : left lateral rotation 60 degrees [TWNoteComboBox6] : right lateral rotation 75 degrees [] : no swelling [TWNoteComboBox7] : forward flexion 75 degrees [de-identified] : extension 20 degrees
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
## 2024-09-17 NOTE — PHYSICAL EXAM
[Rotation to left] : rotation to left [Left Radial Forearm] : left radial forearm [5] : left 5th digit [Extension] : extension [de-identified] : left lateral rotation 60 degrees [TWNoteComboBox6] : right lateral rotation 75 degrees [] : no swelling [TWNoteComboBox7] : forward flexion 75 degrees [de-identified] : extension 20 degrees
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
## 2024-09-20 ENCOUNTER — APPOINTMENT (OUTPATIENT)
Age: 39
End: 2024-09-20
Payer: COMMERCIAL

## 2024-09-20 PROCEDURE — 62321 NJX INTERLAMINAR CRV/THRC: CPT

## 2024-09-20 RX ORDER — DICLOFENAC POTASSIUM 50 MG/1
50 TABLET, COATED ORAL
Qty: 60 | Refills: 0 | Status: ACTIVE | COMMUNITY
Start: 2024-09-20 | End: 1900-01-01

## 2024-09-30 ENCOUNTER — APPOINTMENT (OUTPATIENT)
Dept: PAIN MANAGEMENT | Facility: CLINIC | Age: 39
End: 2024-09-30

## 2024-11-18 ENCOUNTER — APPOINTMENT (OUTPATIENT)
Dept: ORTHOPEDIC SURGERY | Facility: CLINIC | Age: 39
End: 2024-11-18
Payer: COMMERCIAL

## 2024-11-18 VITALS — WEIGHT: 210 LBS | BODY MASS INDEX: 29.4 KG/M2 | HEIGHT: 71 IN

## 2024-11-18 DIAGNOSIS — M75.42 IMPINGEMENT SYNDROME OF LEFT SHOULDER: ICD-10-CM

## 2024-11-18 DIAGNOSIS — M25.512 PAIN IN LEFT SHOULDER: ICD-10-CM

## 2024-11-18 PROCEDURE — 73030 X-RAY EXAM OF SHOULDER: CPT | Mod: LT

## 2024-11-18 PROCEDURE — 73010 X-RAY EXAM OF SHOULDER BLADE: CPT | Mod: LT

## 2024-11-18 PROCEDURE — 99214 OFFICE O/P EST MOD 30 MIN: CPT | Mod: 25

## 2024-11-18 PROCEDURE — 20611 DRAIN/INJ JOINT/BURSA W/US: CPT | Mod: LT

## 2024-11-18 RX ORDER — NAPROXEN 500 MG/1
500 TABLET ORAL
Qty: 60 | Refills: 0 | Status: ACTIVE | COMMUNITY
Start: 2024-11-18 | End: 1900-01-01

## 2024-12-04 ENCOUNTER — APPOINTMENT (OUTPATIENT)
Dept: PAIN MANAGEMENT | Facility: CLINIC | Age: 39
End: 2024-12-04
Payer: COMMERCIAL

## 2024-12-04 DIAGNOSIS — M54.12 RADICULOPATHY, CERVICAL REGION: ICD-10-CM

## 2024-12-04 PROCEDURE — 99214 OFFICE O/P EST MOD 30 MIN: CPT | Mod: ACP

## 2024-12-04 RX ORDER — TIZANIDINE HYDROCHLORIDE 4 MG/1
4 CAPSULE ORAL
Qty: 60 | Refills: 2 | Status: ACTIVE | COMMUNITY
Start: 2024-12-04 | End: 1900-01-01

## 2024-12-04 RX ORDER — DICLOFENAC SODIUM 20 MG/G
2 SOLUTION TOPICAL 4 TIMES DAILY
Qty: 30 | Refills: 2 | Status: ACTIVE | COMMUNITY
Start: 2024-12-04 | End: 1900-01-01

## 2025-01-06 ENCOUNTER — APPOINTMENT (OUTPATIENT)
Dept: ORTHOPEDIC SURGERY | Facility: CLINIC | Age: 40
End: 2025-01-06

## 2025-01-13 ENCOUNTER — APPOINTMENT (OUTPATIENT)
Dept: PAIN MANAGEMENT | Facility: CLINIC | Age: 40
End: 2025-01-13

## 2025-01-13 RX ORDER — TIZANIDINE 4 MG/1
4 TABLET ORAL TWICE DAILY
Qty: 60 | Refills: 2 | Status: ACTIVE | COMMUNITY
Start: 2025-01-13 | End: 1900-01-01

## 2025-01-30 ENCOUNTER — APPOINTMENT (OUTPATIENT)
Dept: PAIN MANAGEMENT | Facility: CLINIC | Age: 40
End: 2025-01-30

## 2025-03-26 NOTE — ED ADULT NURSE NOTE - DRUG PRE-SCREENING (DAST -1)
Learning About Being Active as an Older Adult  Why is being active important as you get older?     Being active is one of the best things you can do for your health. And it's never too late to start. Being active--or getting active, if you aren't already--has definite benefits. It can:  Give you more energy,  Keep your mind sharp.  Improve balance to reduce your risk of falls.  Help you manage chronic illness with fewer medicines.  No matter how old you are, how fit you are, or what health problems you have, there is a form of activity that will work for you. And the more physical activity you can do, the better your overall health will be.  What kinds of activity can help you stay healthy?  Being more active will make your daily activities easier. Physical activity includes planned exercise and things you do in daily life. There are four types of activity:  Aerobic.  Doing aerobic activity makes your heart and lungs strong.  Includes walking, dancing, and gardening.  Aim for at least 2½ hours spread throughout the week.  It improves your energy and can help you sleep better.  Muscle-strengthening.  This type of activity can help maintain muscle and strengthen bones.  Includes climbing stairs, using resistance bands, and lifting or carrying heavy loads.  Aim for at least twice a week.  It can help protect the knees and other joints.  Stretching.  Stretching gives you better range of motion in joints and muscles.  Includes upper arm stretches, calf stretches, and gentle yoga.  Aim for at least twice a week, preferably after your muscles are warmed up from other activities.  It can help you function better in daily life.  Balancing.  This helps you stay coordinated and have good posture.  Includes heel-to-toe walking, nathalie chi, and certain types of yoga.  Aim for at least 3 days a week.  It can reduce your risk of falling.  Even if you have a hard time meeting the recommendations, it's better to be more active  Statement Selected

## 2025-04-18 ENCOUNTER — APPOINTMENT (OUTPATIENT)
Age: 40
End: 2025-04-18

## 2025-04-28 ENCOUNTER — APPOINTMENT (OUTPATIENT)
Dept: PAIN MANAGEMENT | Facility: CLINIC | Age: 40
End: 2025-04-28
Payer: COMMERCIAL

## 2025-04-28 DIAGNOSIS — M54.12 RADICULOPATHY, CERVICAL REGION: ICD-10-CM

## 2025-04-28 PROCEDURE — 62321 NJX INTERLAMINAR CRV/THRC: CPT

## 2025-05-20 ENCOUNTER — APPOINTMENT (OUTPATIENT)
Dept: PAIN MANAGEMENT | Facility: CLINIC | Age: 40
End: 2025-05-20
Payer: COMMERCIAL

## 2025-05-20 VITALS — BODY MASS INDEX: 29.4 KG/M2 | WEIGHT: 210 LBS | HEIGHT: 71 IN

## 2025-05-20 DIAGNOSIS — M51.26 OTHER INTERVERTEBRAL DISC DISPLACEMENT, LUMBAR REGION: ICD-10-CM

## 2025-05-20 PROCEDURE — 99214 OFFICE O/P EST MOD 30 MIN: CPT | Mod: 25

## 2025-05-20 PROCEDURE — 96372 THER/PROPH/DIAG INJ SC/IM: CPT

## 2025-05-20 RX ORDER — DICLOFENAC SODIUM 10 MG/G
1 GEL TOPICAL 3 TIMES DAILY
Qty: 1 | Refills: 0 | Status: ACTIVE | COMMUNITY
Start: 2025-05-20 | End: 1900-01-01

## 2025-05-20 RX ORDER — METHYLPREDNISOLONE 4 MG/1
4 TABLET ORAL
Qty: 1 | Refills: 0 | Status: ACTIVE | COMMUNITY
Start: 2025-05-20 | End: 1900-01-01

## 2025-05-20 RX ORDER — DICLOFENAC SODIUM 10 MG/G
1 GEL TOPICAL
Qty: 100 | Refills: 5 | Status: ACTIVE | COMMUNITY
Start: 2025-05-20 | End: 1900-01-01

## 2025-05-23 RX ORDER — TRAMADOL HYDROCHLORIDE 50 MG/1
50 TABLET, COATED ORAL
Qty: 14 | Refills: 0 | Status: ACTIVE | COMMUNITY
Start: 2025-05-23 | End: 1900-01-01

## 2025-06-11 ENCOUNTER — APPOINTMENT (OUTPATIENT)
Dept: PAIN MANAGEMENT | Facility: CLINIC | Age: 40
End: 2025-06-11
Payer: COMMERCIAL

## 2025-06-11 VITALS — HEIGHT: 71 IN | BODY MASS INDEX: 29.4 KG/M2 | WEIGHT: 210 LBS

## 2025-06-11 PROCEDURE — 99214 OFFICE O/P EST MOD 30 MIN: CPT

## 2025-06-11 RX ORDER — GABAPENTIN 300 MG/1
300 CAPSULE ORAL
Qty: 90 | Refills: 2 | Status: ACTIVE | COMMUNITY
Start: 2025-06-11 | End: 1900-01-01

## 2025-07-11 ENCOUNTER — APPOINTMENT (OUTPATIENT)
Age: 40
End: 2025-07-11

## 2025-07-25 ENCOUNTER — APPOINTMENT (OUTPATIENT)
Dept: PAIN MANAGEMENT | Facility: CLINIC | Age: 40
End: 2025-07-25